# Patient Record
Sex: FEMALE | URBAN - METROPOLITAN AREA
[De-identification: names, ages, dates, MRNs, and addresses within clinical notes are randomized per-mention and may not be internally consistent; named-entity substitution may affect disease eponyms.]

---

## 2017-12-28 ENCOUNTER — APPOINTMENT (RX ONLY)
Dept: URBAN - METROPOLITAN AREA CLINIC 15 | Facility: CLINIC | Age: 52
Setting detail: DERMATOLOGY
End: 2017-12-28

## 2017-12-28 DIAGNOSIS — Z41.9 ENCOUNTER FOR PROCEDURE FOR PURPOSES OTHER THAN REMEDYING HEALTH STATE, UNSPECIFIED: ICD-10-CM

## 2017-12-28 PROCEDURE — ? MEDICAL CONSULTATION: COOLSCULPTING

## 2018-02-02 ENCOUNTER — APPOINTMENT (RX ONLY)
Dept: URBAN - METROPOLITAN AREA CLINIC 15 | Facility: CLINIC | Age: 53
Setting detail: DERMATOLOGY
End: 2018-02-02

## 2018-02-02 DIAGNOSIS — Z41.9 ENCOUNTER FOR PROCEDURE FOR PURPOSES OTHER THAN REMEDYING HEALTH STATE, UNSPECIFIED: ICD-10-CM

## 2018-02-02 PROCEDURE — ? COOLSCULPTING

## 2018-02-02 NOTE — PROCEDURE: COOLSCULPTING
Applicator Size: CoolAdvantage Core
Location 3: other (specify below)
Location 1: lower abdomen
Time (Minutes - Will Only Render If Nonzero): [address]
Suction Settings: The suction settings were per protocol.
Other Location 3: Flanks sides
Time (Minutes - Will Only Render If Nonzero): 35
Post Treatment: After treatment, the suction was turned off, and the applicator was removed from the skin.
Other Location 2: Flanks posterior
Applicator Size: [address]
Price (Use Numbers Only, No Special Characters Or $): 1,685
Applicator Size: CoolAdvantage Petite
Applicator Size: CoolAdvantage Fit
Intro: Prior to treatment, the area was cleaned with alcohol and marked out with a marking pen. The gel sheet was then applied uniformly. The applicator was applied to the skin with good contact and suction.
Consent: Written consent obtained, risks reviewed including, but not limited to, blistering from suction, darker or lighter pigmentary change, bruising, and/or need for multiple treatments.
Device: Coolsculpting
Detail Level: Zone

## 2019-01-01 NOTE — PROCEDURE: MEDICAL CONSULTATION: COOLSCULPTING
Detail Level: Zone
Check here if all serologies below were negative, non-reactive or immune. Otherwise select appropriate status.